# Patient Record
Sex: FEMALE | Race: WHITE | Employment: OTHER | URBAN - METROPOLITAN AREA
[De-identification: names, ages, dates, MRNs, and addresses within clinical notes are randomized per-mention and may not be internally consistent; named-entity substitution may affect disease eponyms.]

---

## 2020-04-01 ENCOUNTER — TRANSFERRED RECORDS (OUTPATIENT)
Dept: MULTI SPECIALTY CLINIC | Facility: CLINIC | Age: 25
End: 2020-04-01

## 2020-04-01 LAB — PAP SMEAR - HIM PATIENT REPORTED: NEGATIVE

## 2021-03-11 DIAGNOSIS — Z84.81 FAMILY HISTORY OF GENETIC DISEASE CARRIER: Primary | ICD-10-CM

## 2021-03-15 DIAGNOSIS — Z84.81 FAMILY HISTORY OF GENETIC DISEASE CARRIER: ICD-10-CM

## 2021-03-15 PROCEDURE — 36415 COLL VENOUS BLD VENIPUNCTURE: CPT | Performed by: STUDENT IN AN ORGANIZED HEALTH CARE EDUCATION/TRAINING PROGRAM

## 2021-03-15 PROCEDURE — 999N001104 HC STATISTIC DNA ISOL HIGH PURITY: Performed by: STUDENT IN AN ORGANIZED HEALTH CARE EDUCATION/TRAINING PROGRAM

## 2021-03-15 NOTE — PROVIDER NOTIFICATION
03/15/21 1409   Child Life   [facility] Only / [facility])   Intervention Procedure Support;Supportive Check In;Preparation   Preparation Comment Supportive check in with patient. Provided Gopher mask to match patient's shirt, which she liked & laughed. This writer assumed she did not require Child Life & coped well with blood draws.   Procedure Support Comment Patient has had a history of fainting after blood draws. Patient felt light headed after the blood draw & felt faint. Patient had not eaten this morning. Provided juice, crackers while patient recovered.   Outcomes/Follow Up Continue to Follow/Support

## 2021-03-19 LAB — COPATH REPORT: NORMAL

## 2021-03-22 ENCOUNTER — OFFICE VISIT (OUTPATIENT)
Dept: OBGYN | Facility: CLINIC | Age: 26
End: 2021-03-22
Payer: COMMERCIAL

## 2021-03-22 VITALS
SYSTOLIC BLOOD PRESSURE: 107 MMHG | DIASTOLIC BLOOD PRESSURE: 65 MMHG | TEMPERATURE: 97.4 F | WEIGHT: 144 LBS | HEART RATE: 62 BPM

## 2021-03-22 DIAGNOSIS — Z32.02 PREGNANCY EXAMINATION OR TEST, NEGATIVE RESULT: ICD-10-CM

## 2021-03-22 DIAGNOSIS — Z30.430 ENCOUNTER FOR IUD INSERTION: Primary | ICD-10-CM

## 2021-03-22 LAB — HCG UR QL: NEGATIVE

## 2021-03-22 PROCEDURE — 58300 INSERT INTRAUTERINE DEVICE: CPT | Performed by: OBSTETRICS & GYNECOLOGY

## 2021-03-22 PROCEDURE — 81025 URINE PREGNANCY TEST: CPT | Performed by: OBSTETRICS & GYNECOLOGY

## 2021-03-22 RX ORDER — IBUPROFEN 800 MG/1
TABLET, FILM COATED ORAL
COMMUNITY
Start: 2020-07-28

## 2021-03-22 RX ORDER — ALUMINUM HYDROXIDE, MAGNESIUM HYDROXIDE, SIMETHICONE 800; 800; 80 MG/10ML; MG/10ML; MG/10ML
SUSPENSION ORAL
COMMUNITY
Start: 2020-05-13

## 2021-03-22 NOTE — PROGRESS NOTES
GYN clinic visit  3/22/2021  CC: IUD insertion    HPI:   Le Cannon is a 25 year old  who presents to clinic today for an IUD insertion.  She has used OCPs for contraception in the past but did not like how she felt on them emotionally. She is also not sure if she would be able to remember to take them regularly. current abnormal vaginal discharge. Her LMP was Patient's last menstrual period was 2021.. Her menses are regular, every 28-30 days for 5-7 days of moderate flow. Last Pap smear was 2020 normal.     Her daughter (7mo) is currently inpatient at [facility]. She was born with gastroschisis and has been in and out of hospital.     OB History    Para Term  AB Living   2 1 0 1 1 1   SAB TAB Ectopic Multiple Live Births   0 0 1 0 1      # Outcome Date GA Lbr Ousmane/2nd Weight Sex Delivery Anes PTL Lv   2  20 36w1d  2.92 kg (6 lb 7 oz) F Vag-Spont   GUILLERMO      Name: Paulina Benoit Ectopic                  Reviewed GYN hx, OB hx, past medical hx, surgical hx and family hx.   Reviewed medications and allergies. Changes made in EPIC.     OBJECTIVE:  Vitals:    21 1132   BP: 107/65   Pulse: 62   Temp: 97.4  F (36.3  C)   TempSrc: Oral   Weight: 65.3 kg (144 lb)     There is no height or weight on file to calculate BMI.    Gen: alert, oriented, no distress, cooperative and pleasant  Abd: soft, nontender, nondistended  : normal external genitalia without lesions or abnormalities. Normal [place]'s, normal [place]'s, normal urethra. Normal vaginal mucosa, no abnormal discharge or lesions. Cervix appears normal, no lesions or erythema. Bimanual exam reveals 6 week sized anteverted mobile uterus, no cervical motion tenderness, no uterine tenderness, no adnexal masses.    PROCEDURE:  Patient has verbalized understanding of risks and benefits. She was counseled on risks of infection, bleeding, uterine perforation, cervical laceration, expulsion, overall risk of pregnancy 2 in 1000,  if she does get pregnant that there is an increased risk of ectopic pregnancy. All questions answered. She has signed the consent form.    Urine pregnancy test was negative.      A medium graves speculum was placed in the vagina with good visualization of the cervix.  The cervix was then swabbed with a betadine x3.  Tenaculum was placed at the 12 o'clock position on the cervix and the uterus sounded to 9cm.  The Kyleena  IUD was then placed in the usual fashion under sterile technique without difficulty.  Strings were clipped about 2-3 cm from the cervical os.  Tenaculum was removed and cervix was hemostatic. There were no complications. The patient tolerated the procedure well.    NDC:24150-738-89 LOT#:[id number] EXP:10/2022    ASSESSMENT:   Le Cannon is a 25 year old  who had a Kyleena IUD inserted today without complication.    PLAN:  1. Encounter for IUD insertion  - INSERTION INTRAUTERINE DEVICE  - levonorgestrel (KYLEENA) 19.5 MG IUD 19.5 mg  - KYLEENA 19.5  MG    2. Pregnancy examination or test, negative result  - HCG Qual, Urine ([id number])      The patient should feel for the IUD strings in 2 weeks.  If unable to locate them, she should return to clinic for a speculum examination for confirmation that the IUD is in place. Bleeding pattern of this particular IUD was discussed with the patient. She is aware that the IUD will need to be removed in 5 years or PRN.  She is to return to clinic for her next annual or PRN.    Xiao Sommers MD

## 2021-03-22 NOTE — Clinical Note
Please abstract the following data from this visit with this patient into the appropriate field in Epic:    Tests that can be patient reported without a hard copy:    Pap smear done on this date: 04/2020 (approximately), by this group: [facility] in [place], [place] , results were NIL.     Other Tests found in the patient's chart through Chart Review/Care Everywhere:        Note to Abstraction: If this section is blank, no results were found via Chart Review/Care Everywhere.

## 2021-03-22 NOTE — NURSING NOTE
Chief Complaint   Patient presents with     IUD     NDC:60550-424-23 LOT#:[id number] EXP:10/2022       Initial /65   Pulse 62   Temp 97.4  F (36.3  C) (Oral)   Wt 65.3 kg (144 lb)   LMP 2021  There is no height or weight on file to calculate BMI.  BP completed using cuff size: regular    Questioned patient about current smoking habits.  Pt. has never smoked.          The following HM Due: NONE      The following patient reported/Care Every where data was sent to:  P ABSTRACT QUALITY INITIATIVES [[id number]]

## 2021-06-20 ENCOUNTER — HEALTH MAINTENANCE LETTER (OUTPATIENT)
Age: 26
End: 2021-06-20

## 2021-10-11 ENCOUNTER — HEALTH MAINTENANCE LETTER (OUTPATIENT)
Age: 26
End: 2021-10-11

## 2022-07-17 ENCOUNTER — HEALTH MAINTENANCE LETTER (OUTPATIENT)
Age: 27
End: 2022-07-17

## 2022-09-25 ENCOUNTER — HEALTH MAINTENANCE LETTER (OUTPATIENT)
Age: 27
End: 2022-09-25

## 2023-08-05 ENCOUNTER — HEALTH MAINTENANCE LETTER (OUTPATIENT)
Age: 28
End: 2023-08-05